# Patient Record
Sex: FEMALE | Race: WHITE | URBAN - METROPOLITAN AREA
[De-identification: names, ages, dates, MRNs, and addresses within clinical notes are randomized per-mention and may not be internally consistent; named-entity substitution may affect disease eponyms.]

---

## 2021-02-18 NOTE — PATIENT DISCUSSION
NOT SEEN ON CAREFUL DEPRESSED EXAM - AREA INFERIORLY ON OPTOS THAT LOOKS SUSPICIOUS PROB 2ND TO VIT OPACIFICATION.

## 2022-04-14 ENCOUNTER — NEW PATIENT (OUTPATIENT)
Dept: URBAN - METROPOLITAN AREA CLINIC 31 | Facility: CLINIC | Age: 62
End: 2022-04-14

## 2022-04-14 DIAGNOSIS — H52.4: ICD-10-CM

## 2022-04-14 PROCEDURE — 92004 COMPRE OPH EXAM NEW PT 1/>: CPT

## 2022-04-14 PROCEDURE — 92015 DETERMINE REFRACTIVE STATE: CPT

## 2022-04-14 ASSESSMENT — VISUAL ACUITY
OS_PH: 20/25
OD_CC: 20/25
OS_CC: 20/40
OD_SC: 20/400
OS_SC: 20/100

## 2022-04-14 ASSESSMENT — TONOMETRY
OD_IOP_MMHG: 16
OS_IOP_MMHG: 16

## 2022-12-30 NOTE — PATIENT DISCUSSION
Capsular haze appears visually significant, RECOMMEND CONSULT with  [facility] for possible YAG capsulotomy.

## 2023-01-04 NOTE — PATIENT DISCUSSION
Capsular haze appears visually significant, RECOMMEND CONSULT with DR Dottie Vázquez for possible YAG capsulotomy.

## 2023-01-04 NOTE — PROCEDURE NOTE: CLINICAL
PROCEDURE NOTE: Intravitreal Ozurdex OD. Diagnosis: Posterior Uveitis. Anesthesia: Subconjunctival. Prep: Betadine Flush. Prior to injection, risks/benefits/alternatives discussed including infection, loss of vision, hemorrhage, cataract, glaucoma, retinal tears or detachment and patient wished to proceed. The patient was seated in the examination chair. Topical anesthesia was induced with Proparicaine 0.5%. Subconjunctival xylocaine 2% approximately 0.5 cc was given for anesthesia. Betadine Prep was performed. The Ozurdex drug implant (dexamethasone 0.7 mg intravitreal implant) was injected into the vitreous cavity. The needle was passed 3.0 mm posterior to the limbus in pseudophakic patients, and 3.5 mm posterior to the limbus in phakic patients. The eye was stabilized using a q tip or cotton tip applicator, and the conjunctiva was displaced from the injection site. The remainder of the intravitreal Ozurdex in the single-use vial was then discarded in a medical waste disposal container. The implant settled inferiorly. The retina was examined following the injection. There was no evidence of hemorrhage, subretinal injection, or retinal detachment. Patient tolerated procedure well. There were no complications. Post-op instructions given. Lot # 1:00PM. Expiration date: 1:00PM/*. The patient was instructed of a follow up appointment in approximately 6 weeks for a limited exam and pressure check and was told to call immediately if the vision decreases and/or if the patient’s eye becomes red, painful, and/or light sensitive. If the patient is unable to reach the doctor within an hour or two, the patient is instructed to go to the emergency room or call 911. Inventory Id: *. The patient was instructed to use Artificial Tears q.i.d. p.r.n for comfort. Anil Howard

## 2023-01-17 NOTE — PATIENT DISCUSSION
Capsular haze appears visually significant, RECOMMEND CONSULT with  [place] for possible YAG capsulotomy.

## 2023-01-18 NOTE — PATIENT DISCUSSION
No NEW retinal tears or retinal detachment seen on clinical exam today. Reviewed the signs and symptoms of retinal tear/retinal detachment and the importance of calling for prompt evaluation should there be increasing floaters, new flashing lights, or decreasing peripheral vision in either eye at any time. Observation recommended. Yes